# Patient Record
Sex: FEMALE | Race: WHITE | NOT HISPANIC OR LATINO | Employment: UNEMPLOYED | URBAN - METROPOLITAN AREA
[De-identification: names, ages, dates, MRNs, and addresses within clinical notes are randomized per-mention and may not be internally consistent; named-entity substitution may affect disease eponyms.]

---

## 2017-12-12 ENCOUNTER — OFFICE VISIT (OUTPATIENT)
Dept: MEDICAL GROUP | Facility: MEDICAL CENTER | Age: 22
End: 2017-12-12
Attending: INTERNAL MEDICINE
Payer: MEDICAID

## 2017-12-12 VITALS
WEIGHT: 147 LBS | DIASTOLIC BLOOD PRESSURE: 68 MMHG | HEIGHT: 65 IN | RESPIRATION RATE: 16 BRPM | BODY MASS INDEX: 24.49 KG/M2 | OXYGEN SATURATION: 96 % | SYSTOLIC BLOOD PRESSURE: 108 MMHG | TEMPERATURE: 98.1 F | HEART RATE: 108 BPM

## 2017-12-12 DIAGNOSIS — R56.9 SEIZURES (HCC): ICD-10-CM

## 2017-12-12 DIAGNOSIS — Z09 HOSPITAL DISCHARGE FOLLOW-UP: ICD-10-CM

## 2017-12-12 PROCEDURE — 99204 OFFICE O/P NEW MOD 45 MIN: CPT | Performed by: INTERNAL MEDICINE

## 2017-12-12 RX ORDER — LEVETIRACETAM 500 MG/1
1 TABLET ORAL 2 TIMES DAILY
Refills: 1 | COMMUNITY
Start: 2017-12-03 | End: 2017-12-12 | Stop reason: SDUPTHER

## 2017-12-12 RX ORDER — LEVETIRACETAM 500 MG/1
500 TABLET ORAL 2 TIMES DAILY
Qty: 60 TAB | Refills: 1 | Status: SHIPPED | OUTPATIENT
Start: 2017-12-12 | End: 2018-05-02

## 2017-12-12 ASSESSMENT — PAIN SCALES - GENERAL: PAINLEVEL: NO PAIN

## 2017-12-12 ASSESSMENT — PATIENT HEALTH QUESTIONNAIRE - PHQ9: CLINICAL INTERPRETATION OF PHQ2 SCORE: 0

## 2017-12-13 PROBLEM — Z09 HOSPITAL DISCHARGE FOLLOW-UP: Status: ACTIVE | Noted: 2017-12-13

## 2017-12-13 NOTE — ASSESSMENT & PLAN NOTE
As discussed in hospital discharge follow-up, patient was discharged with most likely presumed seizure disorder on Keppra. She does not have a neurologist. We have requested the records from [place] regarding her stay including her brain imaging and EEG that was done inpatient. She denies any history of head trauma or family history of epilepsy. She has been stable on the Keppra since discharge. She has not been driving. She denies alcohol and illicit drug use.

## 2017-12-13 NOTE — ASSESSMENT & PLAN NOTE
Patient reports that from December 1 through December 3, she was hospitalized at [facility]. She was helping her father hanging [place] lights outside when she started to feel warm and a little bit dizzy. She had felt this way previously and went to go sit down but before she could, she lost consciousness, fell forward and hit her face on the ladder breaking her nose.  Her father witnessed the episode and stated that she was diffusely shaking. She was unresponsive to his attempts to arouse her. The entire episode lasted for about a minute. She reports confusion after the episode. She also reports biting her tongue. She denies loss of bowel or bladder continence. She does not carry a formal diagnosis of seizures and was not on any antiepileptic medication. She reports one similar episode to this on April 1, 2017. Her family brought her to the hospital about an hour after the episode. She reports being admitted for 2 days and undergoing what sounds like an EEG as well as numerous brain imaging studies. She states that all of this came back normal although we do not have these records. She was started on Keppra 500 mg twice a day which she has been taking without side effects. She denies any further episodes since leaving the hospital.

## 2017-12-13 NOTE — PROGRESS NOTES
Catina Thomas is a 22 y.o. female here for hospital discharge follow-up for possible seizures might establish care  HPI:  No previous PCP  Possible seizures  As discussed in hospital discharge follow-up, patient was discharged with most likely presumed seizure disorder on Keppra. She does not have a neurologist. We have requested the records from [place] regarding her stay including her brain imaging and EEG that was done inpatient. She denies any history of head trauma or family history of epilepsy. She has been stable on the Keppra since discharge. She has not been driving. She denies alcohol and illicit drug use.    Hospital discharge follow-up  Patient reports that from December 1 through December 3, she was hospitalized at [facility]. She was helping her father hanging [place] lights outside when she started to feel warm and a little bit dizzy. She had felt this way previously and went to go sit down but before she could, she lost consciousness, fell forward and hit her face on the ladder breaking her nose.  Her father witnessed the episode and stated that she was diffusely shaking. She was unresponsive to his attempts to arouse her. The entire episode lasted for about a minute. She reports confusion after the episode. She also reports biting her tongue. She denies loss of bowel or bladder continence. She does not carry a formal diagnosis of seizures and was not on any antiepileptic medication. She reports one similar episode to this on April 1, 2017. Her family brought her to the hospital about an hour after the episode. She reports being admitted for 2 days and undergoing what sounds like an EEG as well as numerous brain imaging studies. She states that all of this came back normal although we do not have these records. She was started on Keppra 500 mg twice a day which she has been taking without side effects. She denies any further episodes since leaving the hospital.    Current medicines  "(including changes today)  Current Outpatient Prescriptions   Medication Sig Dispense Refill   • levetiracetam (KEPPRA) 500 MG Tab Take 1 Tab by mouth 2 Times a Day. 60 Tab 1     No current facility-administered medications for this visit.      She  has a past medical history of Head ache.  She  has no past surgical history on file.  Social History   Substance Use Topics   • Smoking status: Never Smoker   • Smokeless tobacco: Never Used   • Alcohol use No     Social History     Social History Narrative   • No narrative on file     Family History   Problem Relation Age of Onset   • Hypertension Mother    • Asthma Father    • Diabetes Paternal Grandfather    • Cancer Neg Hx    • Heart Disease Neg Hx    • Stroke Neg Hx          ROS  As above in HPI  All other systems reviewed and are negative     Objective:     Blood pressure 108/68, pulse (!) 108, temperature 36.7 °C (98.1 °F), resp. rate 16, height 1.651 m (5' 5\"), weight 66.7 kg (147 lb), SpO2 96 %, not currently breastfeeding. Body mass index is 24.46 kg/m².  Physical Exam:    Constitutional: Alert, no distress.  Skin: Warm, dry, good turgor, no rashes in visible areas.  Eye: Equal, round and reactive, conjunctiva clear, lids normal.  ENMT: Lips without lesions, good dentition, oropharynx clear, TM's clear bilaterally.  Neck: Trachea midline, no masses, no thyromegaly. No cervical or supraclavicular lymphadenopathy.  Respiratory: Unlabored respiratory effort, lungs clear to auscultation, no wheezes, no ronchi.  Cardiovascular: Normal S1, S2, no murmur, no edema.  Abdomen: Soft, non-tender, no masses, no hepatosplenomegaly.  Psych: Alert and oriented x3, normal affect and mood.  Neuro: Cranial nerves II through XII are grossly intact, sensation to light touch intact over upper and lower extremities, normal gait and balance, 2+ patellar reflexes bilaterally, 5/5 strength in upper and lower extremities bilaterally      Assessment and Plan:   The following treatment " plan was discussed    1. Possible seizures  Patient's history definitely has some features concerning for seizures. I think it is appropriate for her to see a neurologist for a formal assessment as an outpatient. We have requested the brain imaging records as well as the EEG done inpatient. I have refilled her Keppra until she is able to see neurology for help with whether or not to continue on this medication. Her episodes have some features of vasovagal syncope as well, but I would like to have seizures ruled out before I go with this diagnosis.  - REFERRAL TO NEUROLOGY  - levetiracetam (KEPPRA) 500 MG Tab; Take 1 Tab by mouth 2 Times a Day.  Dispense: 60 Tab; Refill: 1    2. Hospital discharge follow-up  As discussed above, patient will continue her Keppra and follow up with neurology. She will return to the hospital for any similar episodes to the one she just experienced.        Followup: Return if symptoms worsen or fail to improve.

## 2017-12-22 PROBLEM — S02.2XXD CLOSED FRACTURE OF NASAL BONE WITH ROUTINE HEALING: Status: ACTIVE | Noted: 2017-12-22

## 2018-05-02 ENCOUNTER — OFFICE VISIT (OUTPATIENT)
Dept: MEDICAL GROUP | Facility: MEDICAL CENTER | Age: 23
End: 2018-05-02
Attending: INTERNAL MEDICINE
Payer: MEDICAID

## 2018-05-02 VITALS
OXYGEN SATURATION: 95 % | WEIGHT: 145 LBS | HEIGHT: 65 IN | DIASTOLIC BLOOD PRESSURE: 68 MMHG | HEART RATE: 98 BPM | BODY MASS INDEX: 24.16 KG/M2 | RESPIRATION RATE: 16 BRPM | TEMPERATURE: 98.3 F | SYSTOLIC BLOOD PRESSURE: 100 MMHG

## 2018-05-02 DIAGNOSIS — R56.9 SEIZURES (HCC): ICD-10-CM

## 2018-05-02 DIAGNOSIS — Z97.5 NEXPLANON IN PLACE: ICD-10-CM

## 2018-05-02 PROCEDURE — 99213 OFFICE O/P EST LOW 20 MIN: CPT | Performed by: INTERNAL MEDICINE

## 2018-05-02 PROCEDURE — 99212 OFFICE O/P EST SF 10 MIN: CPT | Performed by: INTERNAL MEDICINE

## 2018-05-02 ASSESSMENT — PAIN SCALES - GENERAL: PAINLEVEL: 4=SLIGHT-MODERATE PAIN

## 2018-05-02 NOTE — ASSESSMENT & PLAN NOTE
Patient reports that for the past one month, she has had pretty consistent vaginal bleeding. She states that the bleeding will stop for a day or 2 but then restart. She denies heavy flow or passage of clots. She currently has a nexplanon in place for birth control which she got about a year ago. She has not had significant irregular breakthrough bleeding while on it up until now. She is currently sexually active with one male partner.

## 2018-05-03 NOTE — ASSESSMENT & PLAN NOTE
Patient states that she has stopped her Keppra because she felt that it was causing her significant mood changes. She denies any seizure-like symptoms since stopping it. She has not contacted her neurologist regarding this

## 2018-05-03 NOTE — PROGRESS NOTES
"Subjective:   Catina Thomas is a 22 y.o. female here today for breakthrough vaginal bleeding, seizure medication discussion    Nexplanon in place  Patient reports that for the past one month, she has had pretty consistent vaginal bleeding. She states that the bleeding will stop for a day or 2 but then restart. She denies heavy flow or passage of clots. She currently has a nexplanon in place for birth control which she got about a year ago. She has not had significant irregular breakthrough bleeding while on it up until now. She is currently sexually active with one male partner.     Possible seizures  Patient states that she has stopped her Keppra because she felt that it was causing her significant mood changes. She denies any seizure-like symptoms since stopping it. She has not contacted her neurologist regarding this       Current medicines (including changes today)  No current outpatient prescriptions on file.     No current facility-administered medications for this visit.      She  has a past medical history of Head ache.    ROS   As above in HPI     Objective:     Blood pressure 100/68, pulse 98, temperature 36.8 °C (98.3 °F), resp. rate 16, height 1.651 m (5' 5\"), weight 65.8 kg (145 lb), SpO2 95 %, not currently breastfeeding. Body mass index is 24.13 kg/m².   Physical Exam:  Constitutional: Alert, no distress.  Skin: Warm, dry, good turgor, no rashes in visible areas, nexplanon palpable over left upper inner arm.  Eye: Equal, round and reactive, conjunctiva clear, lids normal.  Psych: Alert and oriented x3, normal affect and mood.      Assessment and Plan:   The following treatment plan was discussed    1. Nexplanon in place  Breakthrough bleeding and spotting likely a side effect. We discussed possibilities which include removal and starting a new type of birth control, additional OCPs on top of the implants in order to help regulate menstrual cycle, there are just giving it a little bit more time. " She has an appointment with her gynecologist who placed the implant in a month. Plans to wait until then and discuss her options with her gynecologist. Reassurance was provided today.    2. Possible seizures  Patient is now off of Keppra without any seizure activity. I encouraged her to follow-up with her neurologist and gave her that contact information again today whether a different medication might be appropriate or if it is safe for her to remain off this medication at this point.        Followup: Return if symptoms worsen or fail to improve.

## 2018-09-21 ENCOUNTER — OFFICE VISIT (OUTPATIENT)
Dept: MEDICAL GROUP | Facility: MEDICAL CENTER | Age: 23
End: 2018-09-21
Attending: INTERNAL MEDICINE
Payer: MEDICAID

## 2018-09-21 VITALS
BODY MASS INDEX: 21.16 KG/M2 | WEIGHT: 127 LBS | SYSTOLIC BLOOD PRESSURE: 100 MMHG | HEIGHT: 65 IN | OXYGEN SATURATION: 97 % | HEART RATE: 94 BPM | TEMPERATURE: 98.8 F | RESPIRATION RATE: 16 BRPM | DIASTOLIC BLOOD PRESSURE: 60 MMHG

## 2018-09-21 DIAGNOSIS — S02.2XXD CLOSED FRACTURE OF NASAL BONE WITH ROUTINE HEALING: ICD-10-CM

## 2018-09-21 DIAGNOSIS — N93.0 PCB (POST COITAL BLEEDING): ICD-10-CM

## 2018-09-21 PROBLEM — N92.6 IRREGULAR MENSTRUAL BLEEDING: Status: ACTIVE | Noted: 2018-09-21

## 2018-09-21 PROBLEM — Z09 HOSPITAL DISCHARGE FOLLOW-UP: Status: RESOLVED | Noted: 2017-12-13 | Resolved: 2018-09-21

## 2018-09-21 PROCEDURE — 99212 OFFICE O/P EST SF 10 MIN: CPT | Performed by: INTERNAL MEDICINE

## 2018-09-21 PROCEDURE — 99214 OFFICE O/P EST MOD 30 MIN: CPT | Performed by: INTERNAL MEDICINE

## 2018-09-21 ASSESSMENT — PAIN SCALES - GENERAL: PAINLEVEL: 2=MINIMAL-SLIGHT

## 2018-09-22 NOTE — PROGRESS NOTES
"Subjective:   Catina Thomas is a 22 y.o. female here today for recent bleeding after sex, difficulty breathing through nose and nasal pain after fracture 9 months ago    PCB (post coital bleeding)  Patient reports vaginal bleeding after each time she is sexually active.  Usually a small amount of blood just following sex and about 2-3 days of period type bleeding after this which requires a pad/tampon.  Has been going on for several weeks.  Denies pain with sex, trauma to vaginal region, external genital lesions.  Did have some moderate vaginal discharge initially which has now resolved.  Has taken pregnancy tests at home which have been negative.  Has nexplanon in place which was put in about 2.5 years ago and does not get menstrual cycles usually.  Has a gynecologist but could not get an appointment for several more weeks at their office.    Closed fracture of nasal bone with routine healing  Had a seizure in Dec of 2017 and fell at that time which caused a mildly displaced R nasal bone fracture.  Patient was hospitalized and no surgical intervention was done for her nose.  She reports increased difficulty breathing through her nose as well as intermittent pain over the nasal bridge and is interested in seeing an ENT specialist to see what can be done surgically at this point.  Denies nose bleeds.         Current medicines (including changes today)  No current outpatient prescriptions on file.     No current facility-administered medications for this visit.      She  has a past medical history of Head ache.    ROS   As above in HPI     Objective:     Blood pressure 100/60, pulse 94, temperature 37.1 °C (98.8 °F), temperature source Temporal, resp. rate 16, height 1.651 m (5' 5\"), weight 57.6 kg (127 lb), SpO2 97 %, not currently breastfeeding. Body mass index is 21.13 kg/m².   Physical Exam:  Constitutional: Alert, no distress.  Skin: Warm, dry, good turgor, no rashes in visible areas.  Eye: Equal, round and " reactive, conjunctiva clear, lids normal.  ENMT: nares are widely patent as far as I can see on exam, septum possibly mildly deviated, no tenderness to palpation over nasal bridge  Abdomen: Soft, non-tender, no masses, no hepatosplenomegaly.       Assessment and Plan:   The following treatment plan was discussed    1. Closed fracture of nasal bone with routine healing  With likely mild deviated septum although reports interfering with breathing, will refer to ENT for consideration of surgical intervention at patient request  - REFERRAL TO ENT    2. PCB (post coital bleeding)  Unclear etiology.  Does not sound traumatic based on patient history.  Concern for cervicitis/vaginitis, patient self collected swabs for testing as below.  Question cervical polyp.  Patient prefers to wait for her GYN to do the pelvic exam, will obtain a transvaginal ultrasound in the meantime.    -f/u with GYN in October  - CHLAMYDIA/GC PCR URINE OR SWAB; Future  - VAGINAL PATHOGENS DNA PANEL; Future  - US-PELVIC TRANSVAGINAL ONLY; Future        Followup: Return if symptoms worsen or fail to improve.

## 2018-09-22 NOTE — ASSESSMENT & PLAN NOTE
Had a seizure in Dec of 2017 and fell at that time which caused a mildly displaced R nasal bone fracture.  Patient was hospitalized and no surgical intervention was done for her nose.  She reports increased difficulty breathing through her nose as well as intermittent pain over the nasal bridge and is interested in seeing an ENT specialist to see what can be done surgically at this point.  Denies nose bleeds.

## 2018-09-22 NOTE — ASSESSMENT & PLAN NOTE
Patient reports vaginal bleeding after each time she is sexually active.  Usually a small amount of blood just following sex and about 2-3 days of period type bleeding after this which requires a pad/tampon.  Has been going on for several weeks.  Denies pain with sex, trauma to vaginal region, external genital lesions.  Did have some moderate vaginal discharge initially which has now resolved.  Has taken pregnancy tests at home which have been negative.  Has nexplanon in place which was put in about 2.5 years ago and does not get menstrual cycles usually.  Has a gynecologist but could not get an appointment for several more weeks at their office.

## 2018-09-27 DIAGNOSIS — N76.0 BACTERIAL VAGINOSIS: ICD-10-CM

## 2018-09-27 DIAGNOSIS — B96.89 BACTERIAL VAGINOSIS: ICD-10-CM

## 2018-09-27 RX ORDER — METRONIDAZOLE 500 MG/1
500 TABLET ORAL 2 TIMES DAILY
Qty: 14 TAB | Refills: 0 | Status: SHIPPED | OUTPATIENT
Start: 2018-09-27 | End: 2018-10-04

## 2019-01-11 ENCOUNTER — OFFICE VISIT (OUTPATIENT)
Dept: MEDICAL GROUP | Facility: MEDICAL CENTER | Age: 24
End: 2019-01-11
Attending: INTERNAL MEDICINE
Payer: MEDICAID

## 2019-01-11 VITALS
TEMPERATURE: 60.8 F | BODY MASS INDEX: 20.66 KG/M2 | HEART RATE: 76 BPM | OXYGEN SATURATION: 95 % | RESPIRATION RATE: 16 BRPM | DIASTOLIC BLOOD PRESSURE: 60 MMHG | HEIGHT: 65 IN | WEIGHT: 124 LBS | SYSTOLIC BLOOD PRESSURE: 100 MMHG

## 2019-01-11 DIAGNOSIS — R22.2 LOCALIZED SWELLING OF BACK: ICD-10-CM

## 2019-01-11 DIAGNOSIS — B34.9 VIRAL ILLNESS: ICD-10-CM

## 2019-01-11 DIAGNOSIS — L98.9 SCALP LESION: ICD-10-CM

## 2019-01-11 PROCEDURE — 99213 OFFICE O/P EST LOW 20 MIN: CPT | Performed by: INTERNAL MEDICINE

## 2019-01-11 PROCEDURE — 99214 OFFICE O/P EST MOD 30 MIN: CPT | Performed by: FAMILY MEDICINE

## 2019-01-11 RX ORDER — IBUPROFEN 400 MG/1
400 TABLET ORAL EVERY 8 HOURS PRN
Qty: 30 TAB | Refills: 0 | Status: SHIPPED | OUTPATIENT
Start: 2019-01-11

## 2019-01-11 ASSESSMENT — PATIENT HEALTH QUESTIONNAIRE - PHQ9: CLINICAL INTERPRETATION OF PHQ2 SCORE: 0

## 2019-01-12 NOTE — PROGRESS NOTES
Chief Complaint:   Chief Complaint   Patient presents with   • Nodule     back and head        HPI: Established patient of Dr. Cornejo, accompanied today here with her mom  Catina Thomas is a 23 y.o. female who presents for concerns about feeling a bump and pain in her back, and a bump in her scalp    Localized swelling of back  Patient said she noticed about 2 months ago that there is some prominence and a bump in her back area, she said recently she noticed that there is pain and tenderness when she presses on the sides at that bump.  She is not sure if this is her spine or something else, patient is history of falling twice last year and bumping her back area.  Denies fever denies lower extremity weakness denies sphincter issues.    Scalp lesion  Noticed 2 days ago a bump in the scalp region associated with pain and tenderness on manipulation.  Denies trauma or incident related to this bump.     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  And was treated with Z-Adarsh          Past medical history, family history, social history and medications reviewed and updated in the record.  Today  Current medications, problem list and allergies reviewed in EPIC today  Health maintenance topics are reviewed and updated.    Patient Active Problem List    Diagnosis Date Noted   • PCB (post coital bleeding) 09/21/2018   • Nexplanon in place 05/02/2018   • Closed fracture of nasal bone with routine healing 12/22/2017   • Possible seizures 12/12/2017     Family History   Problem Relation Age of Onset   • Hypertension Mother    • Asthma Father    • Diabetes Paternal Grandfather    • Cancer Neg Hx    • Heart Disease Neg Hx   "  • Stroke Neg Hx      Social History     Social History   • Marital status: Single     Spouse name: N/A   • Number of children: N/A   • Years of education: N/A     Occupational History   • Not on file.     Social History Main Topics   • Smoking status: Never Smoker   • Smokeless tobacco: Never Used   • Alcohol use No   • Drug use: No   • Sexual activity: Yes     Partners: Male     Birth control/ protection: Implant     Other Topics Concern   • Not on file     Social History Narrative   • No narrative on file       Current Outpatient Prescriptions   Medication Sig Dispense Refill   • ibuprofen (MOTRIN) 400 MG Tab Take 1 Tab by mouth every 8 hours as needed. 30 Tab 0     No current facility-administered medications for this visit.          Review Of Systems  As documented in HPI above  PHYSICAL EXAMINATION:    /60 (BP Location: Left arm, Patient Position: Sitting, BP Cuff Size: Adult)   Pulse 76   Temp (!) 16 °C (60.8 °F) (Temporal)   Resp 16   Ht 1.651 m (5' 5\")   Wt 56.2 kg (124 lb)   SpO2 95%   BMI 20.63 kg/m²   Gen.: Well-developed, well-nourished, no apparent distress, pleasant and cooperative with the examination  HEENT: Normocephalic/atraumatic, sinuses nontender with palpation, TMs clear, nares patent with pink mucosa and clear rhinorrhea, oropharynx clear  Neck: No JVD or bruits, no adenopathy  Cor: Regular rate and rhythm without murmur gallop or rub  Lungs: Clear to auscultation with equal breath sounds bilaterally. No wheezes, rhonchi.    Extremities: No cyanosis, clubbing or edema  Scalp exam: The area with the patient is feeling the bump shows a small pimple that is red in color without evidence of bleeding or erythema around it.  No other lesions noticed    Back exam: There is mild bony prominence in the mid spine area likely the last thoracic spine, no tenderness no erythema noted no soft tissue swelling.    ASSESSMENT/Plan:  1. Localized swelling of back   I am suspecting that this is " normal physiological spine prominence, yet I will do an x-ray for further evaluation to rule out other pathology advised not to manipulate the area no red flags at this time follow-up with PCP as directed    DX-THORACOLUMBAR SPINE-2 VIEWS   2. Scalp lesion   likely  folliculitis without evidence of infection.  Advised to apply cold compress and Motrin ibuprofen as needed and directed    ibuprofen (MOTRIN) 400 MG Tab   3. Viral illness    Supportive care, push fluids take it easy and rest Motrin as needed and directed for pain and fever follow-up with PCP as directed.  ibuprofen (MOTRIN) 400 MG Tab     Please note that this dictation was created using voice recognition software. I have made every reasonable attempt to correct obvious errors but there may be errors of grammar and content that I may have overlooked prior to finalization of this note.

## 2019-01-26 ENCOUNTER — HOSPITAL ENCOUNTER (OUTPATIENT)
Dept: RADIOLOGY | Facility: MEDICAL CENTER | Age: 24
End: 2019-01-26
Attending: FAMILY MEDICINE
Payer: MEDICAID

## 2019-01-26 DIAGNOSIS — R22.2 LOCALIZED SWELLING OF BACK: ICD-10-CM

## 2019-01-26 PROCEDURE — 72080 X-RAY EXAM THORACOLMB 2/> VW: CPT

## 2019-01-30 ENCOUNTER — APPOINTMENT (OUTPATIENT)
Dept: MEDICAL GROUP | Facility: MEDICAL CENTER | Age: 24
End: 2019-01-30
Attending: INTERNAL MEDICINE
Payer: MEDICAID

## 2019-02-05 ENCOUNTER — OFFICE VISIT (OUTPATIENT)
Dept: MEDICAL GROUP | Facility: MEDICAL CENTER | Age: 24
End: 2019-02-05
Attending: INTERNAL MEDICINE
Payer: MEDICAID

## 2019-02-05 ENCOUNTER — TELEPHONE (OUTPATIENT)
Dept: MEDICAL GROUP | Facility: MEDICAL CENTER | Age: 24
End: 2019-02-05

## 2019-02-05 VITALS
TEMPERATURE: 98.9 F | OXYGEN SATURATION: 96 % | DIASTOLIC BLOOD PRESSURE: 60 MMHG | BODY MASS INDEX: 20.16 KG/M2 | WEIGHT: 121 LBS | SYSTOLIC BLOOD PRESSURE: 90 MMHG | HEIGHT: 65 IN | RESPIRATION RATE: 16 BRPM | HEART RATE: 70 BPM

## 2019-02-05 DIAGNOSIS — M54.9 MID BACK PAIN: ICD-10-CM

## 2019-02-05 DIAGNOSIS — R10.13 EPIGASTRIC ABDOMINAL PAIN: ICD-10-CM

## 2019-02-05 DIAGNOSIS — R63.4 WEIGHT LOSS: ICD-10-CM

## 2019-02-05 PROBLEM — N93.0 PCB (POST COITAL BLEEDING): Status: RESOLVED | Noted: 2018-09-21 | Resolved: 2019-02-05

## 2019-02-05 PROCEDURE — 99214 OFFICE O/P EST MOD 30 MIN: CPT | Performed by: INTERNAL MEDICINE

## 2019-02-05 PROCEDURE — 99213 OFFICE O/P EST LOW 20 MIN: CPT | Performed by: INTERNAL MEDICINE

## 2019-02-05 RX ORDER — RANITIDINE 150 MG/1
150 TABLET ORAL 2 TIMES DAILY PRN
Qty: 60 TAB | Refills: 1 | Status: SHIPPED | OUTPATIENT
Start: 2019-02-05 | End: 2019-03-06 | Stop reason: SDUPTHER

## 2019-02-05 RX ORDER — RANITIDINE 300 MG/1
300 TABLET ORAL DAILY
Qty: 30 TAB | Refills: 1 | Status: SHIPPED | OUTPATIENT
Start: 2019-02-05 | End: 2019-02-05

## 2019-02-05 ASSESSMENT — PAIN SCALES - GENERAL: PAINLEVEL: 4=SLIGHT-MODERATE PAIN

## 2019-02-05 NOTE — ASSESSMENT & PLAN NOTE
She reports increased pain on either side of her spine from about the bra line down to the low back.  She has noticed a firm bump over the middle of the spine and is concerned about this.  She recently had an x-ray done for evaluation.  She denies weakness, numbness, tingling in her arms or legs.  She has been using a heating pad on her back which has helped a lot.  She also takes ibuprofen at as needed occasionally.  States that she has been sleeping on a temporary bed which has made the pain worse.

## 2019-02-05 NOTE — TELEPHONE ENCOUNTER
PHARMACY MESSAGE:    RANITIDINE 150 MG IS PREFERRED, OR PA NEEDED. PLEASE ADVISE.     ORIGINAL: RANITIDINE 300 MG DAILY

## 2019-02-05 NOTE — PROGRESS NOTES
Subjective:   Catina Thomas is a 23 y.o. female here today for weight loss, back pain    Weight loss  Patient reports that her normal weight is around 145-150 pounds.  Over the past 8 months, she has gone down to 120 pounds.  She is not been intending to lose the weight.  She states she is not eating very well.  She often will have abdominal pain after eating.  She has been able to tolerate protein shakes without pain, but most other foods have been causing her discomfort.  She feels nauseous in the morning and the pain is worse in the more she denies vomiting.  She is having normal bowel movements and denies melena or hematochezia.  She reports increased stress and depression lately over her living situation and financial situation, although she has recently moved back in with her parents and is feeling better about that.  She has not tried any over-the-counter medications.    Mid back pain  She reports increased pain on either side of her spine from about the bra line down to the low back.  She has noticed a firm bump over the middle of the spine and is concerned about this.  She recently had an x-ray done for evaluation.  She denies weakness, numbness, tingling in her arms or legs.  She has been using a heating pad on her back which has helped a lot.  She also takes ibuprofen at as needed occasionally.  States that she has been sleeping on a temporary bed which has made the pain worse.       Current medicines (including changes today)  Current Outpatient Prescriptions   Medication Sig Dispense Refill   • ranitidine (ZANTAC) 300 MG tablet Take 1 Tab by mouth every day. 30 Tab 1   • ibuprofen (MOTRIN) 400 MG Tab Take 1 Tab by mouth every 8 hours as needed. 30 Tab 0     No current facility-administered medications for this visit.      She  has a past medical history of H/O reduction of nasal fracture; Head ache; and Seizure (HCC).    ROS   As above in HPI     Objective:     Blood pressure (!) 90/60, pulse 70,  "temperature 37.2 °C (98.9 °F), temperature source Temporal, resp. rate 16, height 1.651 m (5' 5\"), weight 54.9 kg (121 lb), SpO2 96 %, not currently breastfeeding. Body mass index is 20.14 kg/m².   Physical Exam:  Constitutional: Alert, no distress.  Skin: Warm, dry, good turgor, no rashes in visible areas.  Eye: Equal, round and reactive, conjunctiva clear, lids normal.  Abdomen: Soft, mild tenderness to palpation in epigastric region without rebound or guarding, bowel sounds present, no masses, no hepatosplenomegaly.  Psych: Alert and oriented x3, normal affect and mood.  Back: Prominent vertebrae around the T12/L1 level that is nontender to palpation, mild tenderness to palpation over bilateral thoracic and lumbar paraspinal muscles    Results and Imaging:   X-ray spine (1/26/19)  FINDINGS:  The alignment is normal. There is no evidence of fracture.  No disc space narrowing or degenerative change is present.       Impression       Unremarkable thoracolumbar spine.         Assessment and Plan:   The following treatment plan was discussed    1. Weight loss  Suspect this is due to poor appetite secondary to epigastric pain.  Top of my differential is gastritis versus peptic ulcer disease exacerbated by current stress.  We will have her start on ranitidine daily for the next 2 weeks and then use it as needed.  We will follow-up with her in 4 weeks.  We will obtain labs as below to evaluate for alternative etiologies of weight loss.  - ranitidine (ZANTAC) 300 MG tablet; Take 1 Tab by mouth every day.  Dispense: 30 Tab; Refill: 1  - TSH WITH REFLEX TO FT4; Future  - COMP METABOLIC PANEL; Future  - CBC WITH DIFFERENTIAL; Future  -4-week follow-up  - HIV AG/AB COMBO ASSAY SCREENING; Future    2. Epigastric abdominal pain  As discussed above, suspect this is GERD/gastritis.  Lower suspicion for GI bleed given no melena or hematochezia or hematemesis.  Labs as above and trial of ranitidine  - ranitidine (ZANTAC) 300 MG " tablet; Take 1 Tab by mouth every day.  Dispense: 30 Tab; Refill: 1    3. Mid back pain  We reviewed her x-ray which was normal.  The bony prominence she is feeling is also normal, and I suspect is more palpable due to her weight loss.  She was reassured.  She will continue to use heat and NSAIDs as needed.  We also discussed adjusting her bed and doing some stretching.        Followup: Return in about 4 weeks (around 3/5/2019), or if symptoms worsen or fail to improve, for weight loss, abdominal pain, back pain.

## 2019-02-05 NOTE — TELEPHONE ENCOUNTER
Prescription changed to 150 mg twice daily as needed.  Please inform patient.    Natacha Cornejo M.D.

## 2019-03-06 ENCOUNTER — OFFICE VISIT (OUTPATIENT)
Dept: MEDICAL GROUP | Facility: MEDICAL CENTER | Age: 24
End: 2019-03-06
Attending: INTERNAL MEDICINE
Payer: MEDICAID

## 2019-03-06 DIAGNOSIS — R51.9 FRONTAL HEADACHE: ICD-10-CM

## 2019-03-06 DIAGNOSIS — R63.4 WEIGHT LOSS: ICD-10-CM

## 2019-03-06 DIAGNOSIS — R10.13 EPIGASTRIC ABDOMINAL PAIN: ICD-10-CM

## 2019-03-06 PROCEDURE — 99213 OFFICE O/P EST LOW 20 MIN: CPT | Performed by: INTERNAL MEDICINE

## 2019-03-06 PROCEDURE — 99214 OFFICE O/P EST MOD 30 MIN: CPT | Performed by: INTERNAL MEDICINE

## 2019-03-06 RX ORDER — RANITIDINE 150 MG/1
150 TABLET ORAL 2 TIMES DAILY PRN
Qty: 60 TAB | Refills: 3 | Status: SHIPPED | OUTPATIENT
Start: 2019-03-06

## 2019-03-06 NOTE — LETTER
[facility]  Natacha Cornejo M.D.  [address]  [place] [address]  Fax: [phone]   Authorization for Release/Disclosure of   Protected Health Information   Name: JONATHAN THOMAS : 1995 SSN: [id number]   Address: [address] [place] [address] Phone:    [phone] (home)    I authorize the entity listed below to release/disclose the PHI below to:   [facility]/Natacha Cornejo M.D. and Natacha Cornejo M.D.   Provider or Entity Name:     Address   City, State, Zip   Phone:      Fax:     Reason for request: continuity of care   Information to be released:    [  ] LAST COLONOSCOPY,  including any PATH REPORT and follow-up  [  ] LAST FIT/COLOGUARD RESULT [  ] LAST DEXA  [  ] LAST MAMMOGRAM  [ x ] LAST PAP  [  ] LAST LABS [  ] RETINA EXAM REPORT  [  ] IMMUNIZATION RECORDS  [  ] Release all info      [  ] Check here and initial the line next to each item to release ALL health information INCLUDING  _____ Care and treatment for drug and / or alcohol abuse  _____ HIV testing, infection status, or AIDS  _____ Genetic Testing    DATES OF SERVICE OR TIME PERIOD TO BE DISCLOSED: _____________  I understand and acknowledge that:  * This Authorization may be revoked at any time by you in writing, except if your health information has already been used or disclosed.  * Your health information that will be used or disclosed as a result of you signing this authorization could be re-disclosed by the recipient. If this occurs, your re-disclosed health information may no longer be protected by State or Federal laws.  * You may refuse to sign this Authorization. Your refusal will not affect your ability to obtain treatment.  * This Authorization becomes effective upon signing and will  on (date) __________.      If no date is indicated, this Authorization will  one (1) year from the signature date.    Name: Jonathan Thomas    Signature:   Date:     3/6/2019       PLEASE  FAX REQUESTED RECORDS BACK TO: [phone]

## 2019-03-07 VITALS
OXYGEN SATURATION: 98 % | RESPIRATION RATE: 12 BRPM | HEIGHT: 65 IN | WEIGHT: 120 LBS | SYSTOLIC BLOOD PRESSURE: 98 MMHG | DIASTOLIC BLOOD PRESSURE: 60 MMHG | TEMPERATURE: 98.9 F | HEART RATE: 98 BPM | BODY MASS INDEX: 19.99 KG/M2

## 2019-03-07 PROBLEM — R51.9 FRONTAL HEADACHE: Status: ACTIVE | Noted: 2019-03-07

## 2019-03-08 NOTE — ASSESSMENT & PLAN NOTE
Her weight has stabilized.  She has not had a chance to get the blood work ordered at last visit done yet.

## 2019-03-08 NOTE — PROGRESS NOTES
"Subjective:   Catina Thomas is a 23 y.o. female here today for follow-up abdominal pain, headaches    Epigastric abdominal pain  She continues to complain of some epigastric abdominal pain with some nausea although she reports improvement on the ranitidine.  States that she is able to eat more and more regularly when she takes it.  She is generally taking 1 dose in the morning and occasionally taking the second dose in the evening if she feels she needs it.    Weight loss  Her weight has stabilized.  She has not had a chance to get the blood work ordered at last visit done yet.    Frontal headache  She complains of intermittent headaches over the right frontal region.  States that these started after she fell and sustained a nasal fracture during her possible seizure episode.  She has a small bump over the right forehead and when this gets irritated, she gets headaches more frequently.  When she has a headache, ibuprofen usually helps to relieve the symptoms.  She denies associated nausea vomiting, photophobia or phonophobia and she is still able to function with the headache.  She does notice that they happen more commonly during her menstrual cycle.  She reports that for the last 2 days she has had headaches off and on.       Current medicines (including changes today)  Current Outpatient Prescriptions   Medication Sig Dispense Refill   • raNITidine (ZANTAC) 150 MG Tab Take 1 Tab by mouth 2 times a day as needed for Heartburn. 60 Tab 3   • ibuprofen (MOTRIN) 400 MG Tab Take 1 Tab by mouth every 8 hours as needed. 30 Tab 0     No current facility-administered medications for this visit.      She  has a past medical history of H/O reduction of nasal fracture; Head ache; and Seizure (HCC).    ROS   Denies chest pain, shortness of breath  As above in HPI     Objective:     Blood pressure (!) 98/60, pulse 98, temperature 37.2 °C (98.9 °F), resp. rate 12, height 1.651 m (5' 5\"), weight 54.4 kg (120 lb), SpO2 98 %, " not currently breastfeeding. Body mass index is 19.97 kg/m².   Physical Exam:  Constitutional: Alert, no distress.  Skin: Warm, dry, good turgor, small firm nodule over right frontal skull that is nontender to palpation, does not appear irritated or inflamed.  Eye: Equal, round and reactive, conjunctiva clear, lids normal.  Psych: Alert and oriented x3, normal affect and mood.        Assessment and Plan:   The following treatment plan was discussed    1. Epigastric abdominal pain  Given good response with ranitidine, suspect most of this is GERD related.  I encouraged her to continue taking the medication regularly in the mornings and then the second dose if needed in the evenings.  Suspect that as her stress decreases and over time she will be able to stop it.  - raNITidine (ZANTAC) 150 MG Tab; Take 1 Tab by mouth 2 times a day as needed for Heartburn.  Dispense: 60 Tab; Refill: 3    2. Weight loss  Suspect secondary to reduced appetite from the abdominal pain.  She is eating better now that she is on the ranitidine and has been able to maintain her weight.  She will still obtain all the labs ordered at last visit.    3. Frontal headache  Do not think this has anything to do with the small nodule on her forehead.  This feels like bone and could be residual from the head trauma that she sustained.  She will continue ibuprofen if needed which works well.        Followup: Return if symptoms worsen or fail to improve.

## 2019-03-08 NOTE — ASSESSMENT & PLAN NOTE
She continues to complain of some epigastric abdominal pain with some nausea although she reports improvement on the ranitidine.  States that she is able to eat more and more regularly when she takes it.  She is generally taking 1 dose in the morning and occasionally taking the second dose in the evening if she feels she needs it.

## 2019-03-08 NOTE — ASSESSMENT & PLAN NOTE
She complains of intermittent headaches over the right frontal region.  States that these started after she fell and sustained a nasal fracture during her possible seizure episode.  She has a small bump over the right forehead and when this gets irritated, she gets headaches more frequently.  When she has a headache, ibuprofen usually helps to relieve the symptoms.  She denies associated nausea vomiting, photophobia or phonophobia and she is still able to function with the headache.  She does notice that they happen more commonly during her menstrual cycle.  She reports that for the last 2 days she has had headaches off and on.

## 2019-03-27 ENCOUNTER — TELEPHONE (OUTPATIENT)
Dept: MEDICAL GROUP | Facility: MEDICAL CENTER | Age: 24
End: 2019-03-27

## 2019-03-27 DIAGNOSIS — R63.4 WEIGHT LOSS: ICD-10-CM

## 2019-03-27 NOTE — TELEPHONE ENCOUNTER
Please advise patient to follow-up with alternative labs such as [facility] so that we can get the screening test done if she still wishes to do so.    Natacha Cornejo M.D.

## 2019-03-27 NOTE — TELEPHONE ENCOUNTER
Spoke with [facility] regarding Pt HIV testing, was informed by the staff that answered that the HIV testing did not get drawn at the same time as Pt labs for CMP, TSH, ands CBC. Was advised that [facility] did not recognize the lab request for HIV and as such DID Not draw this lab. [facility] advised that the Pt would need to be redrawn and they would NOT be calling the Pt to have this done. [facility] ([facility] representative also stated the they would need a new lab order for HIV testing. It was also stated that the only HIV testing they show in there computer is for HIV 1 and 2.

## 2019-03-27 NOTE — TELEPHONE ENCOUNTER
Confirmed that [facility] will bill [facility] Pt.s have place phone call to [facility] rep to follow up on appropriate lab testing.

## 2019-08-28 ENCOUNTER — OFFICE VISIT (OUTPATIENT)
Dept: MEDICAL GROUP | Facility: MEDICAL CENTER | Age: 24
End: 2019-08-28
Attending: INTERNAL MEDICINE
Payer: MEDICAID

## 2019-08-28 VITALS
SYSTOLIC BLOOD PRESSURE: 94 MMHG | DIASTOLIC BLOOD PRESSURE: 60 MMHG | OXYGEN SATURATION: 100 % | TEMPERATURE: 98.1 F | WEIGHT: 117 LBS | HEIGHT: 65 IN | HEART RATE: 92 BPM | RESPIRATION RATE: 12 BRPM | BODY MASS INDEX: 19.49 KG/M2

## 2019-08-28 DIAGNOSIS — Z91.09 ENVIRONMENTAL ALLERGIES: ICD-10-CM

## 2019-08-28 DIAGNOSIS — H92.01 RIGHT EAR PAIN: ICD-10-CM

## 2019-08-28 DIAGNOSIS — K08.89 PAIN, DENTAL: ICD-10-CM

## 2019-08-28 PROBLEM — R51.9 FRONTAL HEADACHE: Status: RESOLVED | Noted: 2019-03-07 | Resolved: 2019-08-28

## 2019-08-28 PROCEDURE — 99212 OFFICE O/P EST SF 10 MIN: CPT | Performed by: INTERNAL MEDICINE

## 2019-08-28 PROCEDURE — 99214 OFFICE O/P EST MOD 30 MIN: CPT | Performed by: INTERNAL MEDICINE

## 2019-08-28 RX ORDER — LORATADINE 10 MG/1
10 TABLET ORAL
Qty: 30 TAB | Refills: 3 | Status: SHIPPED | OUTPATIENT
Start: 2019-08-28

## 2019-08-28 NOTE — ASSESSMENT & PLAN NOTE
She reports significant allergies around this time of year.  She has taken Claritin in the past with good improvement but is not currently taking anything.  She reports itchy eyes, runny nose, and some right ear pain.

## 2019-08-28 NOTE — PROGRESS NOTES
Subjective:   Catina Thomas is a 23 y.o. female here today for dental pain, ear pain, allergies    Pain, dental  Reports that for the past 2 months she has had severe pain off and on of her left upper wisdom tooth.  Several years ago, she was referred for extraction of her wisdom teeth but never followed through with this.  Denies abscess.  She has never had a cavity before.  She brushes her teeth and flosses daily.  When the tooth is painful, she takes some ibuprofen with improvement.    Right ear pain  States that off and on for the past 1 to 2 months, she has had some discomfort in the right ear.  She started cleaning it out with a Q-tip regularly and notices sometimes she will get a little bit of wax and other times a tinge of blood.  Feels like her hearing is muffled on that side.  Sometimes feels like her ear needs to pop.  Denies fevers and chills.    Environmental allergies  She reports significant allergies around this time of year.  She has taken Claritin in the past with good improvement but is not currently taking anything.  She reports itchy eyes, runny nose, and some right ear pain.       Current medicines (including changes today)  Current Outpatient Medications   Medication Sig Dispense Refill   • loratadine (CLARITIN) 10 MG Tab Take 1 Tab by mouth 1 time daily as needed. 30 Tab 3   • raNITidine (ZANTAC) 150 MG Tab Take 1 Tab by mouth 2 times a day as needed for Heartburn. 60 Tab 3   • ibuprofen (MOTRIN) 400 MG Tab Take 1 Tab by mouth every 8 hours as needed. 30 Tab 0     No current facility-administered medications for this visit.      She  has a past medical history of H/O reduction of nasal fracture, Head ache, and Seizure ([facility]).    ROS   Denies chest pain, shortness of breath  As above in HPI     Objective:     Vitals:    08/28/19 1247   BP: (!) 94/60   Pulse: 92   Resp: 12   Temp: 36.7 °C (98.1 °F)   SpO2: 100%     Body mass index is 19.47 kg/m².   Physical Exam:  Constitutional: Alert, no  distress.  Skin: Warm, dry, good turgor, no rashes in visible areas.  Eye: Equal, round and reactive, conjunctiva clear, lids normal.  ENMT: Right inner ear canal is a little bit erythematous but TM is clear, left TM is normal, left upper wisdom tooth is not erythematous, no large cavity or periodontal disease  Neck: Trachea midline, no masses, no thyromegaly. No cervical or supraclavicular lymphadenopathy  Psych: Alert and oriented x3, normal affect and mood.      Assessment and Plan:   The following treatment plan was discussed    1. Pain, dental  Suspect she may have wisdom tooth impaction but I do not see any evidence of an acute dental infection that would warrant antibiotic treatment.  I encouraged her to follow-up with her dentist for wisdom tooth extraction.  She may continue ibuprofen as needed for pain    2. Right ear pain  No evidence of otitis media.  In her ear canal is a little irritated and we discussed using some drops for swimmer's ear.  We will also treat her allergies that she may be having some eustachian tube dysfunction causing some ear discomfort    3. Environmental allergies  - loratadine (CLARITIN) 10 MG Tab; Take 1 Tab by mouth 1 time daily as needed.  Dispense: 30 Tab; Refill: 3        Followup: Return if symptoms worsen or fail to improve.

## 2019-08-28 NOTE — ASSESSMENT & PLAN NOTE
States that off and on for the past 1 to 2 months, she has had some discomfort in the right ear.  She started cleaning it out with a Q-tip regularly and notices sometimes she will get a little bit of wax and other times a tinge of blood.  Feels like her hearing is muffled on that side.  Sometimes feels like her ear needs to pop.  Denies fevers and chills.

## 2019-08-28 NOTE — ASSESSMENT & PLAN NOTE
Reports that for the past 2 months she has had severe pain off and on of her left upper wisdom tooth.  Several years ago, she was referred for extraction of her wisdom teeth but never followed through with this.  Denies abscess.  She has never had a cavity before.  She brushes her teeth and flosses daily.  When the tooth is painful, she takes some ibuprofen with improvement.

## 2020-01-21 ENCOUNTER — TELEPHONE (OUTPATIENT)
Dept: MEDICAL GROUP | Facility: MEDICAL CENTER | Age: 25
End: 2020-01-21

## 2020-01-21 NOTE — TELEPHONE ENCOUNTER
Mailbox is not set up and no email listed in chart.  Will send letter about first no show to appointment today 1/21/20.
